# Patient Record
Sex: MALE | Race: BLACK OR AFRICAN AMERICAN | NOT HISPANIC OR LATINO | URBAN - METROPOLITAN AREA
[De-identification: names, ages, dates, MRNs, and addresses within clinical notes are randomized per-mention and may not be internally consistent; named-entity substitution may affect disease eponyms.]

---

## 2018-09-23 ENCOUNTER — OUTPATIENT (OUTPATIENT)
Dept: EMERGENCY DEPT | Facility: HOSPITAL | Age: 38
LOS: 1 days | Discharge: HOME | End: 2018-09-23
Payer: COMMERCIAL

## 2018-09-23 VITALS
TEMPERATURE: 99 F | DIASTOLIC BLOOD PRESSURE: 93 MMHG | HEART RATE: 110 BPM | OXYGEN SATURATION: 100 % | SYSTOLIC BLOOD PRESSURE: 152 MMHG | RESPIRATION RATE: 20 BRPM

## 2018-09-23 LAB
ALBUMIN SERPL ELPH-MCNC: 4.2 G/DL — SIGNIFICANT CHANGE UP (ref 3.3–5)
ALP SERPL-CCNC: 58 U/L — SIGNIFICANT CHANGE UP (ref 40–120)
ALT FLD-CCNC: 57 U/L — HIGH (ref 4–41)
APTT BLD: 28.7 SEC — SIGNIFICANT CHANGE UP (ref 27.5–37.4)
AST SERPL-CCNC: 34 U/L — SIGNIFICANT CHANGE UP (ref 4–40)
BASE EXCESS BLDV CALC-SCNC: 2.6 MMOL/L — SIGNIFICANT CHANGE UP
BASOPHILS # BLD AUTO: 0.06 K/UL — SIGNIFICANT CHANGE UP (ref 0–0.2)
BASOPHILS NFR BLD AUTO: 0.9 % — SIGNIFICANT CHANGE UP (ref 0–2)
BILIRUB SERPL-MCNC: 0.2 MG/DL — SIGNIFICANT CHANGE UP (ref 0.2–1.2)
BLOOD GAS VENOUS - CREATININE: 1.51 MG/DL — HIGH (ref 0.5–1.3)
BUN SERPL-MCNC: 12 MG/DL — SIGNIFICANT CHANGE UP (ref 7–23)
CALCIUM SERPL-MCNC: 9.1 MG/DL — SIGNIFICANT CHANGE UP (ref 8.4–10.5)
CHLORIDE BLDV-SCNC: 107 MMOL/L — SIGNIFICANT CHANGE UP (ref 96–108)
CHLORIDE SERPL-SCNC: 104 MMOL/L — SIGNIFICANT CHANGE UP (ref 98–107)
CO2 SERPL-SCNC: 24 MMOL/L — SIGNIFICANT CHANGE UP (ref 22–31)
CREAT SERPL-MCNC: 1.67 MG/DL — HIGH (ref 0.5–1.3)
EOSINOPHIL # BLD AUTO: 0.24 K/UL — SIGNIFICANT CHANGE UP (ref 0–0.5)
EOSINOPHIL NFR BLD AUTO: 3.7 % — SIGNIFICANT CHANGE UP (ref 0–6)
GAS PNL BLDV: 143 MMOL/L — SIGNIFICANT CHANGE UP (ref 136–146)
GLUCOSE BLDV-MCNC: 105 — HIGH (ref 70–99)
GLUCOSE SERPL-MCNC: 100 MG/DL — HIGH (ref 70–99)
HCO3 BLDV-SCNC: 25 MMOL/L — SIGNIFICANT CHANGE UP (ref 20–27)
HCT VFR BLD CALC: 43.2 % — SIGNIFICANT CHANGE UP (ref 39–50)
HCT VFR BLDV CALC: 45.7 % — SIGNIFICANT CHANGE UP (ref 39–51)
HGB BLD-MCNC: 14.4 G/DL — SIGNIFICANT CHANGE UP (ref 13–17)
HGB BLDV-MCNC: 14.9 G/DL — SIGNIFICANT CHANGE UP (ref 13–17)
IMM GRANULOCYTES # BLD AUTO: 0.02 # — SIGNIFICANT CHANGE UP
IMM GRANULOCYTES NFR BLD AUTO: 0.3 % — SIGNIFICANT CHANGE UP (ref 0–1.5)
INR BLD: 1.08 — SIGNIFICANT CHANGE UP (ref 0.88–1.17)
LACTATE BLDV-MCNC: 1.7 MMOL/L — SIGNIFICANT CHANGE UP (ref 0.5–2)
LYMPHOCYTES # BLD AUTO: 3.74 K/UL — HIGH (ref 1–3.3)
LYMPHOCYTES # BLD AUTO: 57.6 % — HIGH (ref 13–44)
MCHC RBC-ENTMCNC: 29.4 PG — SIGNIFICANT CHANGE UP (ref 27–34)
MCHC RBC-ENTMCNC: 33.3 % — SIGNIFICANT CHANGE UP (ref 32–36)
MCV RBC AUTO: 88.3 FL — SIGNIFICANT CHANGE UP (ref 80–100)
MONOCYTES # BLD AUTO: 0.64 K/UL — SIGNIFICANT CHANGE UP (ref 0–0.9)
MONOCYTES NFR BLD AUTO: 9.9 % — SIGNIFICANT CHANGE UP (ref 2–14)
NEUTROPHILS # BLD AUTO: 1.79 K/UL — LOW (ref 1.8–7.4)
NEUTROPHILS NFR BLD AUTO: 27.6 % — LOW (ref 43–77)
NRBC # FLD: 0 — SIGNIFICANT CHANGE UP
PCO2 BLDV: 48 MMHG — SIGNIFICANT CHANGE UP (ref 41–51)
PH BLDV: 7.37 PH — SIGNIFICANT CHANGE UP (ref 7.32–7.43)
PLATELET # BLD AUTO: 244 K/UL — SIGNIFICANT CHANGE UP (ref 150–400)
PMV BLD: 9.7 FL — SIGNIFICANT CHANGE UP (ref 7–13)
PO2 BLDV: 30 MMHG — LOW (ref 35–40)
POTASSIUM BLDV-SCNC: 3.7 MMOL/L — SIGNIFICANT CHANGE UP (ref 3.4–4.5)
POTASSIUM SERPL-MCNC: 3.9 MMOL/L — SIGNIFICANT CHANGE UP (ref 3.5–5.3)
POTASSIUM SERPL-SCNC: 3.9 MMOL/L — SIGNIFICANT CHANGE UP (ref 3.5–5.3)
PROT SERPL-MCNC: 7.8 G/DL — SIGNIFICANT CHANGE UP (ref 6–8.3)
PROTHROM AB SERPL-ACNC: 12.4 SEC — SIGNIFICANT CHANGE UP (ref 9.8–13.1)
RBC # BLD: 4.89 M/UL — SIGNIFICANT CHANGE UP (ref 4.2–5.8)
RBC # FLD: 13.9 % — SIGNIFICANT CHANGE UP (ref 10.3–14.5)
SAO2 % BLDV: 49.8 % — LOW (ref 60–85)
SODIUM SERPL-SCNC: 141 MMOL/L — SIGNIFICANT CHANGE UP (ref 135–145)
TROPONIN T, HIGH SENSITIVITY: 10 NG/L — SIGNIFICANT CHANGE UP (ref ?–14)
TROPONIN T, HIGH SENSITIVITY: 9 NG/L — SIGNIFICANT CHANGE UP (ref ?–14)
WBC # BLD: 6.49 K/UL — SIGNIFICANT CHANGE UP (ref 3.8–10.5)
WBC # FLD AUTO: 6.49 K/UL — SIGNIFICANT CHANGE UP (ref 3.8–10.5)

## 2018-09-23 RX ORDER — ASPIRIN/CALCIUM CARB/MAGNESIUM 324 MG
324 TABLET ORAL ONCE
Qty: 0 | Refills: 0 | Status: COMPLETED | OUTPATIENT
Start: 2018-09-23 | End: 2018-09-23

## 2018-09-23 RX ADMIN — Medication 324 MILLIGRAM(S): at 20:18

## 2018-09-23 NOTE — ED CDU PROVIDER INITIAL DAY NOTE - MEDICAL DECISION MAKING DETAILS
38 yo M with HTN, obese, smoker. Pt with TWI on EKG in ED, heart score 3, sent to CDU for tele and stress in am.

## 2018-09-23 NOTE — ED PROVIDER NOTE - PHYSICAL EXAMINATION
Gen: Well appearing, well nourished, awake, alert, oriented to person, place, time/situation and in no apparent distress.  ENMT: Airway patent. Moist mucous membranes.  Cardiac: Normal rate, regular rhythm.  Heart sounds S1, S2.  Respiratory: Breath sounds clear and equal bilaterally. No wheezes/rales/rhonchi.  Abdomen: Abdomen soft, non-distended, non-tender, no guarding.  Musculoskeletal: Atraumatic. No vascular compromise. No calf swelling/tenderness. No pedal edema.  Neuro: Alert, follows commands. Speech is clear, fluent, and appropriate. Moving all extremities spontaneously.  Skin: Skin normal color for race, warm, dry and intact. No evidence of rash.

## 2018-09-23 NOTE — ED PROVIDER NOTE - ATTENDING CONTRIBUTION TO CARE
Kraus: 37 yom with intermittent chest pain substernal, at rest with no sob, no palpitations, non exertional , no cough, no fever.  No previous episodes.  Sxs ongoing for 2 days.  +smoker, no leg swelling, no travel. On exam, pt is well appearing, no distress, clear lungs, BP high 166/80s, nml cardiac, abd soft and non tender, no pitting edema.  EKG shows lateral T wave inversion, no preiovus.  Given abnml EKG will place in CDU and stress with labs, cxr negative.

## 2018-09-23 NOTE — ED CDU PROVIDER INITIAL DAY NOTE - OBJECTIVE STATEMENT
37M h/o HTN p/w chest pain, intermittent since yesterday, onset at rest. The pain is retrosternal, non-radiating, not positional/pleuritic/exertional, lasts a few minutes at a time. +Smoker. No prior cardiac workup. No associated fever, cough, SOB, diaphoresis, or vomiting. No hx of DVT/PE/cancer, calf pain/swelling, hemoptysis, or recent trauma/surgery/immobilization.    CDU JESSICA Yang: 37M h/o HTN p/w chest pain, intermittent since yesterday, onset at rest. The pain is retrosternal, non-radiating, not positional/pleuritic/exertional, lasts a few minutes at a time. +Smoker. No prior cardiac workup. No associated fever, cough, SOB, diaphoresis, or vomiting. No hx of DVT/PE/cancer, calf pain/swelling, hemoptysis, or recent trauma/surgery/immobilization.    CDU JESSICA Jonesanirudh: 36 yo M obeses, with PMHX of HTN (never formally diagnosed, not on medication), everyday smoker presents to the ED c/o midsternal non radiating, non pleuritic, non exertional, chest pain, that he becames more aware of at rest, pt reports that last minutes at a time, and began yesterday for the first time, never had this pain in the past. No fam hx, no previous cardiac workup, Denies fever, chills, sob, cough ,URI symptoms, diaphoresis, nausea, vomiting, abdominal pain, leg swelling, recent travel, DVT/ PE. Pt with TWI on EKG in ED, heart score 3, sent to CDU for tele and stress in am. Pt is currently without cp.

## 2018-09-23 NOTE — ED PROVIDER NOTE - OBJECTIVE STATEMENT
37M h/o HTN p/w chest pain, intermittent since yesterday, onset at rest. The pain is retrosternal, non-radiating, not positional/pleuritic/exertional, lasts a few minutes at a time. +Smoker. No prior cardiac workup. No associated fever, cough, SOB, diaphoresis, or vomiting. No hx of DVT/PE/cancer, calf pain/swelling, hemoptysis, or recent trauma/surgery/immobilization.

## 2018-09-23 NOTE — ED ADULT NURSE NOTE - NSIMPLEMENTINTERV_GEN_ALL_ED
Implemented All Universal Safety Interventions:  [place] to call system. Call bell, personal items and telephone within reach. Instruct patient to call for assistance. Room bathroom lighting operational. Non-slip footwear when patient is off stretcher. Physically safe environment: no spills, clutter or unnecessary equipment. Stretcher in lowest position, wheels locked, appropriate side rails in place.

## 2018-09-23 NOTE — ED ADULT NURSE REASSESSMENT NOTE - NS ED NURSE REASSESS COMMENT FT1
report received at shift change from Mandi BEDOYA RN, pt remains AAOx3, VS as noted, pt in NAD, MD Kraus at bedside for eval, pt awaiting results, will continue to monitor pt.

## 2018-09-23 NOTE — ED PROVIDER NOTE - MEDICAL DECISION MAKING DETAILS
37M p/w CP. concern for ACS (heart score = 3). Presentation not consistent with PE, PNA, or dissection. Plan: ekg, cxr, labs, aspirin, possible CDU for stress test.

## 2018-09-23 NOTE — ED CDU PROVIDER INITIAL DAY NOTE - DETAILS
Chest pain: tele, stress in am.  cex2 10 and 9  EKG with TWI in II, V5V6, rpt ekg unchanged. CXr clear lungs

## 2018-09-23 NOTE — ED CDU PROVIDER INITIAL DAY NOTE - ATTENDING CONTRIBUTION TO CARE
Kraus: 37 yom with intermittent chest pain substernal, at rest with no sob, no palpitations, non exertional , no cough, no fever.  No previous episodes.  Sxs ongoing for 2 days.  +smoker, no leg swelling, no travel. On exam, pt is well appearing, no distress, clear lungs, BP high 166/80s, nml cardiac, abd soft and non tender, no pitting edema.  EKG shows lateral T wave inversion, no preiovus.  Given abnml EKG pt placed in CDU for serial enzymes, and stress test in AM.

## 2018-09-23 NOTE — ED ADULT TRIAGE NOTE - CHIEF COMPLAINT QUOTE
states" I am having chest pain started since yesterday and is getting worst". denies any PMH. saline lock josefa left arm by EMS

## 2018-09-23 NOTE — ED ADULT NURSE NOTE - OBJECTIVE STATEMENT
patient alert ox3 came in c/o chest pain started since yesterday and got worst today " states the pain feel like in the middle of the chest .denies any PMH. labs done as ordered. EKG shows T inversions in lateral leads. awaiting results and re eval.

## 2018-09-24 ENCOUNTER — TRANSCRIPTION ENCOUNTER (OUTPATIENT)
Age: 38
End: 2018-09-24

## 2018-09-24 VITALS
OXYGEN SATURATION: 98 % | HEART RATE: 87 BPM | RESPIRATION RATE: 16 BRPM | TEMPERATURE: 99 F | DIASTOLIC BLOOD PRESSURE: 60 MMHG | SYSTOLIC BLOOD PRESSURE: 127 MMHG

## 2018-09-24 DIAGNOSIS — R94.39 ABNORMAL RESULT OF OTHER CARDIOVASCULAR FUNCTION STUDY: ICD-10-CM

## 2018-09-24 DIAGNOSIS — I10 ESSENTIAL (PRIMARY) HYPERTENSION: ICD-10-CM

## 2018-09-24 DIAGNOSIS — R07.9 CHEST PAIN, UNSPECIFIED: ICD-10-CM

## 2018-09-24 DIAGNOSIS — F17.200 NICOTINE DEPENDENCE, UNSPECIFIED, UNCOMPLICATED: ICD-10-CM

## 2018-09-24 DIAGNOSIS — Z29.9 ENCOUNTER FOR PROPHYLACTIC MEASURES, UNSPECIFIED: ICD-10-CM

## 2018-09-24 LAB
ALBUMIN SERPL ELPH-MCNC: 4.1 G/DL — SIGNIFICANT CHANGE UP (ref 3.3–5)
ALP SERPL-CCNC: 58 U/L — SIGNIFICANT CHANGE UP (ref 40–120)
ALT FLD-CCNC: 64 U/L — HIGH (ref 4–41)
AST SERPL-CCNC: 36 U/L — SIGNIFICANT CHANGE UP (ref 4–40)
BILIRUB SERPL-MCNC: 0.5 MG/DL — SIGNIFICANT CHANGE UP (ref 0.2–1.2)
BUN SERPL-MCNC: 15 MG/DL — SIGNIFICANT CHANGE UP (ref 7–23)
CALCIUM SERPL-MCNC: 9.3 MG/DL — SIGNIFICANT CHANGE UP (ref 8.4–10.5)
CHLORIDE SERPL-SCNC: 104 MMOL/L — SIGNIFICANT CHANGE UP (ref 98–107)
CO2 SERPL-SCNC: 26 MMOL/L — SIGNIFICANT CHANGE UP (ref 22–31)
CREAT SERPL-MCNC: 1.41 MG/DL — HIGH (ref 0.5–1.3)
GLUCOSE SERPL-MCNC: 92 MG/DL — SIGNIFICANT CHANGE UP (ref 70–99)
HBA1C BLD-MCNC: 6.2 % — HIGH (ref 4–5.6)
POTASSIUM SERPL-MCNC: 4.1 MMOL/L — SIGNIFICANT CHANGE UP (ref 3.5–5.3)
POTASSIUM SERPL-SCNC: 4.1 MMOL/L — SIGNIFICANT CHANGE UP (ref 3.5–5.3)
PROT SERPL-MCNC: 7.7 G/DL — SIGNIFICANT CHANGE UP (ref 6–8.3)
SODIUM SERPL-SCNC: 142 MMOL/L — SIGNIFICANT CHANGE UP (ref 135–145)

## 2018-09-24 PROCEDURE — 93018 CV STRESS TEST I&R ONLY: CPT | Mod: GC

## 2018-09-24 PROCEDURE — 93016 CV STRESS TEST SUPVJ ONLY: CPT | Mod: GC

## 2018-09-24 PROCEDURE — 78452 HT MUSCLE IMAGE SPECT MULT: CPT | Mod: 26

## 2018-09-24 RX ORDER — INFLUENZA VIRUS VACCINE 15; 15; 15; 15 UG/.5ML; UG/.5ML; UG/.5ML; UG/.5ML
0.5 SUSPENSION INTRAMUSCULAR ONCE
Qty: 0 | Refills: 0 | Status: COMPLETED | OUTPATIENT
Start: 2018-09-24 | End: 2018-09-24

## 2018-09-24 RX ORDER — ASPIRIN/CALCIUM CARB/MAGNESIUM 324 MG
81 TABLET ORAL DAILY
Qty: 0 | Refills: 0 | Status: DISCONTINUED | OUTPATIENT
Start: 2018-09-24 | End: 2018-09-24

## 2018-09-24 RX ORDER — SODIUM CHLORIDE 9 MG/ML
500 INJECTION INTRAMUSCULAR; INTRAVENOUS; SUBCUTANEOUS
Qty: 0 | Refills: 0 | Status: DISCONTINUED | OUTPATIENT
Start: 2018-09-24 | End: 2018-09-24

## 2018-09-24 RX ADMIN — Medication 81 MILLIGRAM(S): at 13:38

## 2018-09-24 RX ADMIN — SODIUM CHLORIDE 100 MILLILITER(S): 9 INJECTION INTRAMUSCULAR; INTRAVENOUS; SUBCUTANEOUS at 17:10

## 2018-09-24 NOTE — H&P ADULT - HISTORY OF PRESENT ILLNESS
38 yo male pmhx HTN, obesity, smoker, presenting to [facility] ED for intermittent chest pain (CP) starting yesterday afternoon. Pt states that he was just sitting at his desk when he had a "fluttering" pain, rating it at an 8/10. The pain lasted for 2 minutes and went away. Pt states the the pain was on the left side of his chest without radiation. He continued to have intermittent CP throughout the day, so he came to the [facility] ED around 2030 last night. Pt admits to some palpitations associated with the CP.  CP is not positional, pleuritic, exertional, reproducible, or associated with diet. Pt denies any SOB, diaphoresis, nausea, vomiting, dizziness, b/l extremity swelling, BLANCAS, fevers, chills, cough, and PND. He has experienced pain like this in the past and went to see his NP, where it was recommended that he start taking metoprolol. Pt did not comply with recommendation, rather "wanted to try lifestyle modifications first." 38 yo male pmhx HTN, obesity, smoker, presenting to [facility] ED for intermittent chest pain (CP) starting yesterday afternoon. Pt states that he was just sitting at his desk when he had a "fluttering" pain, rating it at an 8/10. The pain lasted for 2 minutes and went away. Pt states the the pain was on the left side of his chest without radiation. He continued to have intermittent CP throughout the day, so he came to the [facility] ED around 2030 last night. Pt admits to some palpitations associated with the CP.  CP is not positional, pleuritic, exertional, reproducible, or associated with diet. Pt denies any SOB, diaphoresis, nausea, vomiting, dizziness, b/l extremity swelling, BLANCAS, fevers, chills, cough, and PND. He has experienced pain like this in the past and went to see his NP, where it was recommended that he start taking metoprolol. Pt did not comply with recommendation, rather "wanted to try lifestyle modifications first."     Today pt had 1mm ST elevations during stress test V1-V4, no CP. 38 yo male pmhx HTN, obesity, smoker, presenting to [facility] ED for intermittent chest pain (CP) starting yesterday afternoon. Pt states that he was just sitting at his desk when he had a "fluttering" pain, rating it at an 8/10. The pain lasted for 2 minutes and went away. Pt states the the pain was on the left side of his chest without radiation. He continued to have intermittent CP throughout the day, so he came to the [facility] ED around 2030 last night. Pt admits to some palpitations associated with the CP.  CP is not positional, pleuritic, exertional, reproducible, or associated with diet. Pt denies any SOB, diaphoresis, nausea, vomiting, dizziness, b/l extremity swelling, BLANCAS, fevers, chills, cough, and PND. He has experienced pain like this in the past and went to see his NP, where it was recommended that he start taking metoprolol. Pt did not comply with recommendation, rather "wanted to try lifestyle modifications first."     In CDU pt had a Nuclear stress test, which was abnormal and admitted for cardiac cath.

## 2018-09-24 NOTE — ED CDU PROVIDER SUBSEQUENT DAY NOTE - ATTENDING CONTRIBUTION TO CARE
Ti: 38 yo male with no significant medical history (likely some undiagnosed borderline HTN) presented to the ED for substernal chest pain that began the day prior to presentation. Pain began at rest. Pt denies any associated SOB, abdominal pain, nausea or vomiting. NO Le pain or edema. No recent travel or immobilization. Pt currently asymptomatic. Ed workup remarkable for abnormal EKG- inverted T waves. Exam: GENERAL: well appearing, NAD, HEENT: MMM, PERRLA, CARDIO: +S1/S2, no murmurs, rubs or gallops, LUNGS: CTA B/L, no wheezing, rales or rhonchi, ABD: soft, nontender, BSx4 quadrants, no guarding or rigidity. EXT: No LE edema or calf TTP, 2+ distal pulses x 4 extremities. NEURO: AxOx3, SKIN: no rashes or lesions, well perfused A/P- 38 yo male with chest pain and abnormal EKG. CDU plan for stress test. will continue tele monitoring.

## 2018-09-24 NOTE — H&P ADULT - NSHPSOCIALHISTORY_GEN_ALL_CORE
Pt lives at home in [place] with wife and kids, smoke tobacco around "1 pack a week", and drinks Thursday through Saturday (2 glasses of wine a night). Pt is an EMT.

## 2018-09-24 NOTE — DISCHARGE NOTE ADULT - HOSPITAL COURSE
38 yo male pmhx HTN, obesity, smoker, presenting to [facility] ED for intermittent chest pain (CP) starting yesterday afternoon. Pt states that he was just sitting at his desk when he had a "fluttering" pain, rating it at an 8/10. The pain lasted for 2 minutes and went away. Pt states the the pain was on the left side of his chest without radiation. He continued to have intermittent CP throughout the day, so he came to the [facility] ED around 2030 last night. Pt admits to some palpitations associated with the CP.  CP is not positional, pleuritic, exertional, reproducible, or associated with diet. Pt denies any SOB, diaphoresis, nausea, vomiting, dizziness, b/l extremity swelling, BLANCAS, fevers, chills, cough, and PND. He has experienced pain like this in the past and went to see his NP, where it was recommended that he start taking metoprolol. Pt did not comply with recommendation, rather "wanted to try lifestyle modifications first."   In CDU pt had a Nuclear stress test, which was abnormal and admitted for cardiac cath.    Pt's cardiac catheretization showed luminal irregularities and EF 65%.  Pt determined to be stable for discharge post procedure and will follow up with Dr. Epps in 1-2 weeks as an outpatient

## 2018-09-24 NOTE — H&P ADULT - ATTENDING COMMENTS
Patient seen and examined.  Agree with above pa note.  all labs/tests reviewed  patient with htn, smoker admitted with new onset chest pain  nuclear stress with large lateral defect  no cp free  Patient denies any chest pain, dyspnea, palpitations, cough, syncope, edema, exertional symptoms, nausea, abdominal pain, fever, chills,  or rash.  Denies any history of CAD, MI, valve disease, cardiomyopathy, or congenital heart disease.  denies any illicit drug use, steroid use    exam normal   ecg no acute ischemic changes  judy negative    a/P    37 year old man with HTN, tobacco use adm with chest pain, with abnl nuclear stress test     1. Chest pain/abnl stress  await cath today   asa  dispo pending above  monitor creat

## 2018-09-24 NOTE — H&P ADULT - RS GEN PE MLT RESP DETAILS PC
respirations non-labored/no subcutaneous emphysema/no rales/no chest wall tenderness/no wheezes/clear to auscultation bilaterally/no intercostal retractions/no rhonchi/airway patent/breath sounds equal/good air movement/normal

## 2018-09-24 NOTE — H&P ADULT - NEUROLOGICAL DETAILS
sensation intact/cranial nerves intact/alert and oriented x 3/responds to verbal commands/normal strength

## 2018-09-24 NOTE — H&P ADULT - PROBLEM SELECTOR PLAN 1
Admit to telemetry  Trend Bobbi   Serial EKGs  lipid profile, HbA1c  TTE  Left Heart Catheterization Admit to telemetry  Trend Bobbi   Serial EKGs  lipid profile, HbA1c  NPO for Left Heart Catheterization  Started on Ecotrin 81mg po daily

## 2018-09-24 NOTE — ED CDU PROVIDER SUBSEQUENT DAY NOTE - HISTORY
36 yo M obeses, with PMHX of HTN (never formally diagnosed, not on medication), everyday smoker presents to the ED c/o midsternal non radiating, non pleuritic, non exertional, chest pain, that he becames more aware of at rest, pt reports that last minutes at a time, and began yesterday for the first time, never had this pain in the past. No fam hx, no previous cardiac workup, Denies fever, chills, sob, cough ,URI symptoms, diaphoresis, nausea, vomiting, abdominal pain, leg swelling, recent travel, DVT/ PE. Pt with TWI on EKG in ED, heart score 3, sent to CDU for tele and stress in am. Pt is currently without cp.  Pt completed stress , pending results.   Denies any c pain, sob or complaints, feeling well. 38 yo M obese, with PMHX of HTN (never formally diagnosed, not on medication), everyday smoker presents to the ED c/o midsternal non radiating, non pleuritic, non exertional, chest pain, that he becames more aware of at rest, pt reports that last minutes at a time, and began yesterday for the first time, never had this pain in the past. No fam hx, no previous cardiac workup, Denies fever, chills, sob, cough ,URI symptoms, diaphoresis, nausea, vomiting, abdominal pain, leg swelling, recent travel, DVT/ PE. Pt with TWI on EKG in ED, heart score 3, sent to CDU for tele and stress in am. Pt is currently without cp.  Pt completed stress , pending results.   Denies any c pain, sob or complaints, feeling well.

## 2018-09-24 NOTE — DISCHARGE NOTE ADULT - PATIENT PORTAL LINK FT
You can access the [facility] Patient Portal, offered by [facility], by registering with the following website: [email or web address]

## 2018-09-24 NOTE — H&P ADULT - PROBLEM SELECTOR PLAN 4
Pt reports she will quit on her own, refused smoking cessation nicotine. Pt reports he will quit on her own, refused smoking cessation nicotine.

## 2018-09-24 NOTE — H&P ADULT - NSHPLABSRESULTS_GEN_ALL_CORE
14.4   6.49  )-----------( 244                  43.2   09-24    142  |  104  |  15  ----------------------------<  92  4.1   |  26  |  1.41<H>    Ca    9.3         TPro  7.7  /  Alb  4.1  /  TBili  0.5  /  DBili  x   /  AST  36  /  ALT  64<H>  /  AlkPhos  58  09-24    Troponin: 10-->9  EKG: NSR @ 95, TWI I, AvL, V5, V6, II  CXR: clear lungs

## 2018-09-24 NOTE — DISCHARGE NOTE ADULT - CARE PLAN
Principal Discharge DX:	Chest pain, unspecified type  Goal:	Resolution of symptoms  Assessment and plan of treatment:	Follow up with your Primary Care Doctor or Cardiologist in 1-2 weeks  If symptoms reoccur call your doctor and if unreachable return to Emergency Department  Secondary Diagnosis:	NENITA (acute kidney injury)  Assessment and plan of treatment:	Have repeat blood work within 1-2 weeks  Increase daily fluid intake

## 2018-09-24 NOTE — H&P ADULT - ASSESSMENT
36 yo male pmhx HTN, obesity, smoker, presenting to [facility] ED for intermittent chest pain (CP) starting yesterday afternoon, admit to telemetry due to abnormal stress test.

## 2018-09-24 NOTE — DISCHARGE NOTE ADULT - CARE PROVIDER_API CALL
Isai Epps (MD), Cardiovascular Disease; Internal Medicine; Interventional Cardiology; Nuclear Cardiology  [address]  [place] [address]  Phone: [phone]  Fax: [phone]

## 2018-09-24 NOTE — ED CDU PROVIDER SUBSEQUENT DAY NOTE - PROGRESS NOTE DETAILS
Call from Dr Murrieta,  large lateral wall defect.   Pt with ST dep/sx's.  Recommending., Cath.  SW Dr Isai Epps , will admit patient.     Text paged and sw tele pA Call from Dr Murrieta,  large lateral wall defect.   Pt with ST dep. Asx at current time. Dr Murrieta Recommending., Cath.  SW Dr Isai Epps , will admit patient. No heparin at this time    Text paged and sw tele pA

## 2018-09-24 NOTE — DISCHARGE NOTE ADULT - PLAN OF CARE
Resolution of symptoms Follow up with your Primary Care Doctor or Cardiologist in 1-2 weeks  If symptoms reoccur call your doctor and if unreachable return to Emergency Department Have repeat blood work within 1-2 weeks  Increase daily fluid intake

## 2018-09-24 NOTE — ED CDU PROVIDER DISPOSITION NOTE - CLINICAL COURSE
36 yo male with no significant medical history (likely some undiagnosed borderline HTN) presented to the ED for substernal chest pain that began the day prior to presentation. Pain began at rest. Pt denies any associated SOB, abdominal pain, nausea or vomiting. NO Le pain or edema. No recent travel or immobilization. Pt currently asymptomatic. Ed workup remarkable for abnormal EKG- inverted T waves. Exam: GENERAL: well appearing, NAD, HEENT: MMM, PERRLA, CARDIO: +S1/S2, no murmurs, rubs or gallops, LUNGS: CTA B/L, no wheezing, rales or rhonchi, ABD: soft, nontender, BSx4 quadrants, no guarding or rigidity. EXT: No LE edema or calf TTP, 2+ distal pulses x 4 extremities. NEURO: AxOx3, SKIN: no rashes or lesions, well perfused A/P- 36 yo male with chest pain and abnormal EKG. Nuclear Stress Test shows lateral wall abnormality. Pt admitted to cardiology for further care.,

## 2018-11-26 ENCOUNTER — EMERGENCY (EMERGENCY)
Facility: HOSPITAL | Age: 38
LOS: 1 days | Discharge: HOME | End: 2018-11-26
Attending: EMERGENCY MEDICINE | Admitting: EMERGENCY MEDICINE
Payer: SELF-PAY

## 2018-11-26 VITALS
TEMPERATURE: 98 F | HEART RATE: 83 BPM | RESPIRATION RATE: 19 BRPM | OXYGEN SATURATION: 98 % | DIASTOLIC BLOOD PRESSURE: 70 MMHG | SYSTOLIC BLOOD PRESSURE: 190 MMHG

## 2018-11-26 VITALS
HEART RATE: 109 BPM | SYSTOLIC BLOOD PRESSURE: 158 MMHG | TEMPERATURE: 98 F | DIASTOLIC BLOOD PRESSURE: 98 MMHG | RESPIRATION RATE: 16 BRPM | OXYGEN SATURATION: 99 %

## 2018-11-26 PROBLEM — I10 ESSENTIAL (PRIMARY) HYPERTENSION: Chronic | Status: ACTIVE | Noted: 2018-09-23

## 2018-11-26 LAB
ALBUMIN SERPL ELPH-MCNC: 3.8 G/DL — SIGNIFICANT CHANGE UP (ref 3.3–5)
ALP SERPL-CCNC: 58 U/L — SIGNIFICANT CHANGE UP (ref 40–120)
ALT FLD-CCNC: 27 U/L — SIGNIFICANT CHANGE UP (ref 4–41)
AST SERPL-CCNC: 17 U/L — SIGNIFICANT CHANGE UP (ref 4–40)
BASOPHILS # BLD AUTO: 0.04 K/UL — SIGNIFICANT CHANGE UP (ref 0–0.2)
BASOPHILS NFR BLD AUTO: 0.7 % — SIGNIFICANT CHANGE UP (ref 0–2)
BILIRUB SERPL-MCNC: 0.2 MG/DL — SIGNIFICANT CHANGE UP (ref 0.2–1.2)
BUN SERPL-MCNC: 14 MG/DL — SIGNIFICANT CHANGE UP (ref 7–23)
CALCIUM SERPL-MCNC: 9.1 MG/DL — SIGNIFICANT CHANGE UP (ref 8.4–10.5)
CHLORIDE SERPL-SCNC: 106 MMOL/L — SIGNIFICANT CHANGE UP (ref 98–107)
CO2 SERPL-SCNC: 22 MMOL/L — SIGNIFICANT CHANGE UP (ref 22–31)
CREAT SERPL-MCNC: 1.21 MG/DL — SIGNIFICANT CHANGE UP (ref 0.5–1.3)
D DIMER BLD IA.RAPID-MCNC: < 150 NG/ML — SIGNIFICANT CHANGE UP
EOSINOPHIL # BLD AUTO: 0.36 K/UL — SIGNIFICANT CHANGE UP (ref 0–0.5)
EOSINOPHIL NFR BLD AUTO: 6 % — SIGNIFICANT CHANGE UP (ref 0–6)
GLUCOSE SERPL-MCNC: 101 MG/DL — HIGH (ref 70–99)
HCT VFR BLD CALC: 42.5 % — SIGNIFICANT CHANGE UP (ref 39–50)
HGB BLD-MCNC: 13.8 G/DL — SIGNIFICANT CHANGE UP (ref 13–17)
IMM GRANULOCYTES # BLD AUTO: 0.01 # — SIGNIFICANT CHANGE UP
IMM GRANULOCYTES NFR BLD AUTO: 0.2 % — SIGNIFICANT CHANGE UP (ref 0–1.5)
LYMPHOCYTES # BLD AUTO: 2.57 K/UL — SIGNIFICANT CHANGE UP (ref 1–3.3)
LYMPHOCYTES # BLD AUTO: 42.8 % — SIGNIFICANT CHANGE UP (ref 13–44)
MCHC RBC-ENTMCNC: 28.5 PG — SIGNIFICANT CHANGE UP (ref 27–34)
MCHC RBC-ENTMCNC: 32.5 % — SIGNIFICANT CHANGE UP (ref 32–36)
MCV RBC AUTO: 87.8 FL — SIGNIFICANT CHANGE UP (ref 80–100)
MONOCYTES # BLD AUTO: 0.59 K/UL — SIGNIFICANT CHANGE UP (ref 0–0.9)
MONOCYTES NFR BLD AUTO: 9.8 % — SIGNIFICANT CHANGE UP (ref 2–14)
NEUTROPHILS # BLD AUTO: 2.44 K/UL — SIGNIFICANT CHANGE UP (ref 1.8–7.4)
NEUTROPHILS NFR BLD AUTO: 40.5 % — LOW (ref 43–77)
NRBC # FLD: 0 — SIGNIFICANT CHANGE UP
PLATELET # BLD AUTO: 225 K/UL — SIGNIFICANT CHANGE UP (ref 150–400)
PMV BLD: 9.7 FL — SIGNIFICANT CHANGE UP (ref 7–13)
POTASSIUM SERPL-MCNC: 3.9 MMOL/L — SIGNIFICANT CHANGE UP (ref 3.5–5.3)
POTASSIUM SERPL-SCNC: 3.9 MMOL/L — SIGNIFICANT CHANGE UP (ref 3.5–5.3)
PROT SERPL-MCNC: 7.2 G/DL — SIGNIFICANT CHANGE UP (ref 6–8.3)
RBC # BLD: 4.84 M/UL — SIGNIFICANT CHANGE UP (ref 4.2–5.8)
RBC # FLD: 14 % — SIGNIFICANT CHANGE UP (ref 10.3–14.5)
SODIUM SERPL-SCNC: 140 MMOL/L — SIGNIFICANT CHANGE UP (ref 135–145)
WBC # BLD: 6.01 K/UL — SIGNIFICANT CHANGE UP (ref 3.8–10.5)
WBC # FLD AUTO: 6.01 K/UL — SIGNIFICANT CHANGE UP (ref 3.8–10.5)

## 2018-11-26 PROCEDURE — 99284 EMERGENCY DEPT VISIT MOD MDM: CPT | Mod: 25

## 2018-11-26 PROCEDURE — 99053 MED SERV 10PM-8AM 24 HR FAC: CPT

## 2018-11-26 RX ORDER — LISINOPRIL 2.5 MG/1
1 TABLET ORAL
Qty: 14 | Refills: 0
Start: 2018-11-26 | End: 2018-12-09

## 2018-11-26 RX ORDER — LISINOPRIL 2.5 MG/1
5 TABLET ORAL ONCE
Qty: 0 | Refills: 0 | Status: COMPLETED | OUTPATIENT
Start: 2018-11-26 | End: 2018-11-26

## 2018-11-26 RX ADMIN — LISINOPRIL 5 MILLIGRAM(S): 2.5 TABLET ORAL at 03:16

## 2018-11-26 NOTE — ED PROVIDER NOTE - MEDICAL DECISION MAKING DETAILS
minor flow limiting irregularities w/ no flow limiting lesions 39yo M pmh as above, underwent cardiac cath in September, here for intermittent sob X1 wk and abnormal heart rhythm on self-cardiac auscultation at home. Do not suspect ACS. Low suspicion for PE, but does not PERC out, therefore will get d-dimer. If normal, may f/u with pmd and cardiologist outpt for holter monitor and htn management. 39yo M pmh as above, underwent cardiac cath in September, here for intermittent sob X1 wk and abnormal heart rhythm on self-cardiac auscultation at home. ECG is sinus tachycardia w/ PVCs, ST segment/J point elevation in V1/V2 w/ TWIs in V5/V6 all present on prior ecg. Do not suspect ACS. Low suspicion for PE, but does not PERC out, therefore will get d-dimer. If normal, may f/u with pmd and cardiologist outpt for holter monitor and htn management.

## 2018-11-26 NOTE — ED PROVIDER NOTE - OBJECTIVE STATEMENT
Outpatient Provider:  Care Providers for Follow up (PCP/Outpatient Provider) Isai Epps), Cardiovascular Disease; Internal Medicine; Interventional Cardiology; Nuclear Cardiology  [address] [facility] [address]  [place] [address]  Phone: [phone]  Fax: [phone]. Pertinent PMH/PSH/FHx/SHx and Review of Systems contained within:  37yo M w/ pmh of htn and NENITA, seen in Sept for cp, had ischemic ecg findings and concerning stress test while in our CDU unit, therefore underwent cardiac catheterization showing minor luminal irregularities without flow limiting lesions, and discharged w/ instructions to f/u with pmd and cardiology, presents today for eval of palpitations. Pt states he didn't follow up outpt as instructed because his symptoms had resolved prior to discharge from recent hospitalization and he "felt fine". Pt reports that he has been having       Outpatient Provider:  Care Providers for Follow up (PCP/Outpatient Provider) Isai Epps), Cardiovascular Disease; Internal Medicine; Interventional Cardiology; Nuclear Cardiology  [address] [facility] [address] [place] [address]  Phone: [phone]  Fax: [phone]. Pertinent PMH/PSH/FHx/SHx and Review of Systems contained within:  37yo M w/ pmh of obesity, htn and NENITA, seen in Sept for cp, had ischemic ecg findings and concerning stress test while in our CDU unit, therefore underwent cardiac catheterization showing minor luminal irregularities without flow limiting lesions, and discharged w/ instructions to f/u with pmd and cardiology, presents today for eval of "palpitations". Pt states he didn't follow up outpt as instructed because his symptoms had resolved prior to discharge from recent hospitalization and he "felt fine". Pt reports that he has been having intermittent sob X 1wk, started after 2 hour flight to [place], not exertional, occurs mostly at rest when he begins "feeling anxious".  Pt states that a few hours ago, he felt transient sob, therefore listened to his own heart with a stethoscope and noticed that his heart "would beat quickly, then pause for a second or two", did NOT actually feel palpitation, but ausculation finding was concerning enough that he decided to come to ED. Denies chest pain/pressure, diaphoresis, hx of dvt or PE, LE edema or pain. No fever/chills, No photophobia/eye pain/changes in vision, No ear pain/sore throat/dysphagia, No chest pain, no cough/wheeze/stridor, No abdominal pain, No N/V/D, no dysuria/frequency/discharge, No neck/back pain, no rash, no new focal neuro symptoms.

## 2018-11-26 NOTE — ED ADULT NURSE NOTE - NSIMPLEMENTINTERV_GEN_ALL_ED
Implemented All Universal Safety Interventions:  [place] to call system. Call bell, personal items and telephone within reach. Instruct patient to call for assistance. Room bathroom lighting operational. Non-slip footwear when patient is off stretcher. Physically safe environment: no spills, clutter or unnecessary equipment. Stretcher in lowest position, wheels locked, appropriate side rails in place.

## 2018-11-26 NOTE — ED ADULT NURSE NOTE - OBJECTIVE STATEMENT
Pt received aa&ox4 no pertinent pmh p/w palpitations since last Tuesday. Pt states he's felt his "heart race" intermittently over the last week. Pt denies symptoms at this time. Pt placed on monitor NSR noted. Pt hypertensive in ED, MD aware.  Pt denies cp, sob, ab pain, fever chills. 20g IV placed in RT AC, lab sent. Will monitor.

## 2018-11-26 NOTE — ED PROVIDER NOTE - PHYSICAL EXAMINATION
Gen: Alert, NAD, anxious appearing, obese  Head: NC, AT,  EOMI, normal lids/conjunctiva  ENT:  normal hearing, patent oropharynx without erythema/exudate  Neck: +supple, no tenderness/meningismus/JVD, +Trachea midline  Chest: no chest wall tenderness, equal chest rise  Pulm: Bilateral BS, normal resp effort, no wheeze/stridor/retractions  CV: tachy, no M/R/G, +dist pulses  Abd: +BS, soft, NT/ND  Rectal: deferred  Mskel: no edema/erythema/cyanosis  Skin: no rash  Neuro: AAOx3, no sensory/motor deficits, CN 2-12 intact

## 2019-11-14 NOTE — H&P ADULT - NS MD HP PULSE CAROTID
right normal/left normal Bilobed Flap Text: The defect edges were debeveled with a #15c scalpel blade.  Given the location of the defect and the proximity to free margins a bilobe flap was deemed most appropriate.  Using a sterile surgical marker, an appropriate bilobe flap drawn around the defect.    The area thus outlined was incised deep to adipose tissue with a #15 scalpel blade.  The skin margins were undermined to an appropriate distance in all directions utilizing iris scissors.

## 2020-06-26 PROBLEM — Z00.00 ENCOUNTER FOR PREVENTIVE HEALTH EXAMINATION: Status: ACTIVE | Noted: 2020-06-26

## 2020-07-06 ENCOUNTER — APPOINTMENT (OUTPATIENT)
Dept: SURGERY | Facility: CLINIC | Age: 40
End: 2020-07-06
Payer: COMMERCIAL

## 2020-07-06 ENCOUNTER — TRANSCRIPTION ENCOUNTER (OUTPATIENT)
Age: 40
End: 2020-07-06

## 2020-07-06 DIAGNOSIS — I10 ESSENTIAL (PRIMARY) HYPERTENSION: ICD-10-CM

## 2020-07-06 DIAGNOSIS — E66.01 MORBID (SEVERE) OBESITY DUE TO EXCESS CALORIES: ICD-10-CM

## 2020-07-06 DIAGNOSIS — Z78.9 OTHER SPECIFIED HEALTH STATUS: ICD-10-CM

## 2020-07-06 DIAGNOSIS — F17.200 NICOTINE DEPENDENCE, UNSPECIFIED, UNCOMPLICATED: ICD-10-CM

## 2020-07-06 DIAGNOSIS — F17.210 NICOTINE DEPENDENCE, CIGARETTES, UNCOMPLICATED: ICD-10-CM

## 2020-07-06 PROCEDURE — 99244 OFF/OP CNSLTJ NEW/EST MOD 40: CPT | Mod: GT

## 2020-07-06 NOTE — HISTORY OF PRESENT ILLNESS
[Home] : at home, [unfilled] , at the time of the visit. [Verbal consent obtained from patient] : the patient, [unfilled] [Medical Office: ([facility])___] : at the medical office located in  [de-identified] : Cornelius Lugo is a 39 year old male here for a weight loss consultation visit. \par \par Patient reports a significant increase in weight the last several years while he was in school and working full-time.  He is currently at his highest weight, 350 pounds, and has noted a significant effect on his overall health and mobility.  He was previously diagnosed with hypertension.  He has managed to control it somewhat with dietary changes, however is struggling to lose weight.  He is unable to exercise as much as he would like due to work.  He needs help controlling portion size.  He is interested in pursuing laparoscopic sleeve gastrectomy.\par \par Medical history is significant for hypertension.\par Surgical history is negative.\par The patient denies prior history of blood clot or abnormal bleeding.\par The patient denies prior history of reflux or dysphagia.\par The patient smokes 2 to 4 cigarettes/week.\par

## 2020-07-06 NOTE — ASSESSMENT
[FreeTextEntry1] : 39-year-old male with morbid obesity (height 6 feet, weight 350 pounds, BMI 47.5) and comorbidity of hypertension presenting for evaluation for laparoscopic sleeve gastrectomy.\par \par The patient has failed multiple prior attempts at weight loss and is now dealing with the side effects, risk factors, and poor quality of life associated with morbid obesity.  They would benefit from surgical weight loss as outlined in the NIH and  ASMBS consensus statements on bariatric surgery.  Surgical intervention is medically indicated.\par \par My impression is that the patient is a reasonable candidate for laparoscopic sleeve gastrectomy.\par

## 2020-07-06 NOTE — PLAN
[FreeTextEntry1] : I have discussed my impression and treatment plan with the patient.  All surgical options were discussed including non-surgical treatments.  The patient would like to proceed with laparoscopic sleeve gastrectomy.  \par \par Benefits and risks of the planned surgery were discussed in depth, particularly leak, bleeding, sepsis, fistula, GERD, blood clots, dysphagia, malnutrition, weight regain, prolonged hospital stay and the remote possibility of death.   Also discussed was the importance of adhering to the recommended nutritional guidelines and supplements and attending regular follow-up.  I reviewed the critical importance of behavioral modification and follow-up in order to optimize outcomes and avoid complications. The patient was given the opportunity to ask pertinent questions and expressed good understanding of the aforementioned issues.\par \par Plan will be for laparoscopic sleeve gastrectomy after completion of:\par - medical weight management program\par - upper endoscopy\par - nutritional evaluation\par - medical, pulmonary and cardiac clearance\par - psychological evaluation\par \par I have discussed with the patient the requirement that he quit smoking prior to surgery.

## 2020-07-07 DIAGNOSIS — Z01.818 ENCOUNTER FOR OTHER PREPROCEDURAL EXAMINATION: ICD-10-CM

## 2021-04-23 NOTE — ED ADULT TRIAGE NOTE - HEART RATE (BEATS/MIN)
Patient received your message in regards to her US results. She listed the main concerns that she is having. Please see [employer] message and advise. Abeba Mercado RN     [phone]

## 2021-10-06 PROBLEM — I10 ESSENTIAL HYPERTENSION: Status: ACTIVE | Noted: 2020-07-06

## 2022-11-13 ENCOUNTER — INPATIENT (INPATIENT)
Facility: HOSPITAL | Age: 42
LOS: 1 days | Discharge: HOME | End: 2022-11-15
Attending: STUDENT IN AN ORGANIZED HEALTH CARE EDUCATION/TRAINING PROGRAM | Admitting: STUDENT IN AN ORGANIZED HEALTH CARE EDUCATION/TRAINING PROGRAM
Payer: COMMERCIAL

## 2022-11-13 VITALS
SYSTOLIC BLOOD PRESSURE: 153 MMHG | TEMPERATURE: 98 F | HEART RATE: 100 BPM | OXYGEN SATURATION: 100 % | DIASTOLIC BLOOD PRESSURE: 85 MMHG | RESPIRATION RATE: 18 BRPM

## 2022-11-13 PROCEDURE — 93010 ELECTROCARDIOGRAM REPORT: CPT | Mod: NC

## 2022-11-13 PROCEDURE — 99285 EMERGENCY DEPT VISIT HI MDM: CPT

## 2022-11-13 NOTE — ED ADULT TRIAGE NOTE - CHIEF COMPLAINT QUOTE
Pt reporting to the ED for RUQ pain starting yesterday. No nausea, vomiting. pmh HTN. Reports he started taking "double doses" of his metoprolol, losartan and amlodipine for 2 days ( not advised by his MD). Did not take medication today. Awaiting ekg

## 2022-11-14 ENCOUNTER — TRANSCRIPTION ENCOUNTER (OUTPATIENT)
Age: 42
End: 2022-11-14

## 2022-11-14 DIAGNOSIS — R10.811 RIGHT UPPER QUADRANT ABDOMINAL TENDERNESS: ICD-10-CM

## 2022-11-14 DIAGNOSIS — I10 ESSENTIAL (PRIMARY) HYPERTENSION: ICD-10-CM

## 2022-11-14 DIAGNOSIS — I26.99 OTHER PULMONARY EMBOLISM WITHOUT ACUTE COR PULMONALE: ICD-10-CM

## 2022-11-14 DIAGNOSIS — Z90.3 ACQUIRED ABSENCE OF STOMACH [PART OF]: Chronic | ICD-10-CM

## 2022-11-14 LAB
ALBUMIN SERPL ELPH-MCNC: 3.8 G/DL — SIGNIFICANT CHANGE UP (ref 3.3–5)
ALP SERPL-CCNC: 64 U/L — SIGNIFICANT CHANGE UP (ref 40–120)
ALT FLD-CCNC: 28 U/L — SIGNIFICANT CHANGE UP (ref 4–41)
ANION GAP SERPL CALC-SCNC: 12 MMOL/L — SIGNIFICANT CHANGE UP (ref 7–14)
APTT BLD: 125.3 SEC — CRITICAL HIGH (ref 27–36.3)
APTT BLD: 27.8 SEC — SIGNIFICANT CHANGE UP (ref 27–36.3)
APTT BLD: 73.6 SEC — HIGH (ref 27–36.3)
AST SERPL-CCNC: 18 U/L — SIGNIFICANT CHANGE UP (ref 4–40)
BASE EXCESS BLDV CALC-SCNC: 3.1 MMOL/L — HIGH (ref -2–3)
BASOPHILS # BLD AUTO: 0.07 K/UL — SIGNIFICANT CHANGE UP (ref 0–0.2)
BASOPHILS NFR BLD AUTO: 0.8 % — SIGNIFICANT CHANGE UP (ref 0–2)
BILIRUB SERPL-MCNC: 0.7 MG/DL — SIGNIFICANT CHANGE UP (ref 0.2–1.2)
BLOOD GAS VENOUS COMPREHENSIVE RESULT: SIGNIFICANT CHANGE UP
BUN SERPL-MCNC: 7 MG/DL — SIGNIFICANT CHANGE UP (ref 7–23)
CALCIUM SERPL-MCNC: 9.2 MG/DL — SIGNIFICANT CHANGE UP (ref 8.4–10.5)
CHLORIDE BLDV-SCNC: 103 MMOL/L — SIGNIFICANT CHANGE UP (ref 96–108)
CHLORIDE SERPL-SCNC: 98 MMOL/L — SIGNIFICANT CHANGE UP (ref 98–107)
CO2 BLDV-SCNC: 30.5 MMOL/L — HIGH (ref 22–26)
CO2 SERPL-SCNC: 24 MMOL/L — SIGNIFICANT CHANGE UP (ref 22–31)
CREAT SERPL-MCNC: 1.1 MG/DL — SIGNIFICANT CHANGE UP (ref 0.5–1.3)
EGFR: 86 ML/MIN/1.73M2 — SIGNIFICANT CHANGE UP
EOSINOPHIL # BLD AUTO: 0.24 K/UL — SIGNIFICANT CHANGE UP (ref 0–0.5)
EOSINOPHIL NFR BLD AUTO: 2.7 % — SIGNIFICANT CHANGE UP (ref 0–6)
GAS PNL BLDV: 136 MMOL/L — SIGNIFICANT CHANGE UP (ref 136–145)
GAS PNL BLDV: SIGNIFICANT CHANGE UP
GLUCOSE BLDV-MCNC: 86 MG/DL — SIGNIFICANT CHANGE UP (ref 70–99)
GLUCOSE SERPL-MCNC: 89 MG/DL — SIGNIFICANT CHANGE UP (ref 70–99)
HCO3 BLDV-SCNC: 29 MMOL/L — SIGNIFICANT CHANGE UP (ref 22–29)
HCT VFR BLD CALC: 36.9 % — LOW (ref 39–50)
HCT VFR BLD CALC: 37.4 % — LOW (ref 39–50)
HCT VFR BLDA CALC: 37 % — LOW (ref 39–51)
HGB BLD CALC-MCNC: 12.3 G/DL — LOW (ref 13–17)
HGB BLD-MCNC: 12.2 G/DL — LOW (ref 13–17)
HGB BLD-MCNC: 12.5 G/DL — LOW (ref 13–17)
IANC: 4.83 K/UL — SIGNIFICANT CHANGE UP (ref 1.8–7.4)
IMM GRANULOCYTES NFR BLD AUTO: 0.2 % — SIGNIFICANT CHANGE UP (ref 0–0.9)
INR BLD: 1.22 RATIO — HIGH (ref 0.88–1.16)
LACTATE BLDV-MCNC: 1 MMOL/L — SIGNIFICANT CHANGE UP (ref 0.5–2)
LIDOCAIN IGE QN: 47 U/L — SIGNIFICANT CHANGE UP (ref 7–60)
LYMPHOCYTES # BLD AUTO: 2.74 K/UL — SIGNIFICANT CHANGE UP (ref 1–3.3)
LYMPHOCYTES # BLD AUTO: 31.2 % — SIGNIFICANT CHANGE UP (ref 13–44)
MCHC RBC-ENTMCNC: 30.4 PG — SIGNIFICANT CHANGE UP (ref 27–34)
MCHC RBC-ENTMCNC: 30.8 PG — SIGNIFICANT CHANGE UP (ref 27–34)
MCHC RBC-ENTMCNC: 33.1 GM/DL — SIGNIFICANT CHANGE UP (ref 32–36)
MCHC RBC-ENTMCNC: 33.4 GM/DL — SIGNIFICANT CHANGE UP (ref 32–36)
MCV RBC AUTO: 92 FL — SIGNIFICANT CHANGE UP (ref 80–100)
MCV RBC AUTO: 92.1 FL — SIGNIFICANT CHANGE UP (ref 80–100)
MONOCYTES # BLD AUTO: 0.89 K/UL — SIGNIFICANT CHANGE UP (ref 0–0.9)
MONOCYTES NFR BLD AUTO: 10.1 % — SIGNIFICANT CHANGE UP (ref 2–14)
NEUTROPHILS # BLD AUTO: 4.83 K/UL — SIGNIFICANT CHANGE UP (ref 1.8–7.4)
NEUTROPHILS NFR BLD AUTO: 55 % — SIGNIFICANT CHANGE UP (ref 43–77)
NRBC # BLD: 0 /100 WBCS — SIGNIFICANT CHANGE UP (ref 0–0)
NRBC # BLD: 0 /100 WBCS — SIGNIFICANT CHANGE UP (ref 0–0)
NRBC # FLD: 0 K/UL — SIGNIFICANT CHANGE UP (ref 0–0)
NRBC # FLD: 0 K/UL — SIGNIFICANT CHANGE UP (ref 0–0)
PCO2 BLDV: 49 MMHG — SIGNIFICANT CHANGE UP (ref 42–55)
PH BLDV: 7.38 — SIGNIFICANT CHANGE UP (ref 7.32–7.43)
PLATELET # BLD AUTO: 267 K/UL — SIGNIFICANT CHANGE UP (ref 150–400)
PLATELET # BLD AUTO: 267 K/UL — SIGNIFICANT CHANGE UP (ref 150–400)
PO2 BLDV: 34 MMHG — SIGNIFICANT CHANGE UP
POTASSIUM BLDV-SCNC: 3.2 MMOL/L — LOW (ref 3.5–5.1)
POTASSIUM SERPL-MCNC: 3.3 MMOL/L — LOW (ref 3.5–5.3)
POTASSIUM SERPL-SCNC: 3.3 MMOL/L — LOW (ref 3.5–5.3)
PROT SERPL-MCNC: 7.2 G/DL — SIGNIFICANT CHANGE UP (ref 6–8.3)
PROTHROM AB SERPL-ACNC: 14.2 SEC — HIGH (ref 10.5–13.4)
RBC # BLD: 4.01 M/UL — LOW (ref 4.2–5.8)
RBC # BLD: 4.06 M/UL — LOW (ref 4.2–5.8)
RBC # FLD: 13.3 % — SIGNIFICANT CHANGE UP (ref 10.3–14.5)
RBC # FLD: 13.5 % — SIGNIFICANT CHANGE UP (ref 10.3–14.5)
SAO2 % BLDV: 50.8 % — SIGNIFICANT CHANGE UP
SARS-COV-2 RNA SPEC QL NAA+PROBE: SIGNIFICANT CHANGE UP
SODIUM SERPL-SCNC: 134 MMOL/L — LOW (ref 135–145)
TROPONIN T, HIGH SENSITIVITY RESULT: 10 NG/L — SIGNIFICANT CHANGE UP
TROPONIN T, HIGH SENSITIVITY RESULT: 10 NG/L — SIGNIFICANT CHANGE UP
WBC # BLD: 8.79 K/UL — SIGNIFICANT CHANGE UP (ref 3.8–10.5)
WBC # BLD: 8.96 K/UL — SIGNIFICANT CHANGE UP (ref 3.8–10.5)
WBC # FLD AUTO: 8.79 K/UL — SIGNIFICANT CHANGE UP (ref 3.8–10.5)
WBC # FLD AUTO: 8.96 K/UL — SIGNIFICANT CHANGE UP (ref 3.8–10.5)

## 2022-11-14 PROCEDURE — 71275 CT ANGIOGRAPHY CHEST: CPT | Mod: 26,MA

## 2022-11-14 PROCEDURE — 76705 ECHO EXAM OF ABDOMEN: CPT | Mod: 26

## 2022-11-14 PROCEDURE — 12345: CPT | Mod: NC

## 2022-11-14 PROCEDURE — 99223 1ST HOSP IP/OBS HIGH 75: CPT

## 2022-11-14 PROCEDURE — 71046 X-RAY EXAM CHEST 2 VIEWS: CPT | Mod: 26

## 2022-11-14 PROCEDURE — 74177 CT ABD & PELVIS W/CONTRAST: CPT | Mod: 26,MA

## 2022-11-14 RX ORDER — POTASSIUM CHLORIDE 20 MEQ
40 PACKET (EA) ORAL ONCE
Refills: 0 | Status: COMPLETED | OUTPATIENT
Start: 2022-11-14 | End: 2022-11-14

## 2022-11-14 RX ORDER — APIXABAN 2.5 MG/1
1 TABLET, FILM COATED ORAL
Qty: 70 | Refills: 0
Start: 2022-11-14 | End: 2022-12-13

## 2022-11-14 RX ORDER — LANOLIN ALCOHOL/MO/W.PET/CERES
3 CREAM (GRAM) TOPICAL AT BEDTIME
Refills: 0 | Status: DISCONTINUED | OUTPATIENT
Start: 2022-11-14 | End: 2022-11-15

## 2022-11-14 RX ORDER — LOSARTAN POTASSIUM 100 MG/1
25 TABLET, FILM COATED ORAL DAILY
Refills: 0 | Status: DISCONTINUED | OUTPATIENT
Start: 2022-11-14 | End: 2022-11-15

## 2022-11-14 RX ORDER — HEPARIN SODIUM 5000 [USP'U]/ML
10000 INJECTION INTRAVENOUS; SUBCUTANEOUS EVERY 6 HOURS
Refills: 0 | Status: DISCONTINUED | OUTPATIENT
Start: 2022-11-14 | End: 2022-11-15

## 2022-11-14 RX ORDER — HEPARIN SODIUM 5000 [USP'U]/ML
10000 INJECTION INTRAVENOUS; SUBCUTANEOUS ONCE
Refills: 0 | Status: COMPLETED | OUTPATIENT
Start: 2022-11-14 | End: 2022-11-14

## 2022-11-14 RX ORDER — HEPARIN SODIUM 5000 [USP'U]/ML
INJECTION INTRAVENOUS; SUBCUTANEOUS
Qty: 25000 | Refills: 0 | Status: DISCONTINUED | OUTPATIENT
Start: 2022-11-14 | End: 2022-11-15

## 2022-11-14 RX ORDER — AMLODIPINE BESYLATE 2.5 MG/1
10 TABLET ORAL DAILY
Refills: 0 | Status: DISCONTINUED | OUTPATIENT
Start: 2022-11-14 | End: 2022-11-15

## 2022-11-14 RX ORDER — ONDANSETRON 8 MG/1
4 TABLET, FILM COATED ORAL EVERY 8 HOURS
Refills: 0 | Status: DISCONTINUED | OUTPATIENT
Start: 2022-11-14 | End: 2022-11-15

## 2022-11-14 RX ORDER — ACETAMINOPHEN 500 MG
650 TABLET ORAL EVERY 6 HOURS
Refills: 0 | Status: DISCONTINUED | OUTPATIENT
Start: 2022-11-14 | End: 2022-11-15

## 2022-11-14 RX ORDER — SODIUM CHLORIDE 9 MG/ML
1000 INJECTION INTRAMUSCULAR; INTRAVENOUS; SUBCUTANEOUS ONCE
Refills: 0 | Status: COMPLETED | OUTPATIENT
Start: 2022-11-14 | End: 2022-11-14

## 2022-11-14 RX ORDER — METOPROLOL TARTRATE 50 MG
50 TABLET ORAL DAILY
Refills: 0 | Status: DISCONTINUED | OUTPATIENT
Start: 2022-11-14 | End: 2022-11-15

## 2022-11-14 RX ORDER — MORPHINE SULFATE 50 MG/1
4 CAPSULE, EXTENDED RELEASE ORAL ONCE
Refills: 0 | Status: DISCONTINUED | OUTPATIENT
Start: 2022-11-14 | End: 2022-11-14

## 2022-11-14 RX ORDER — ENOXAPARIN SODIUM 100 MG/ML
130 INJECTION SUBCUTANEOUS
Qty: 14 | Refills: 0
Start: 2022-11-14 | End: 2022-11-20

## 2022-11-14 RX ORDER — HEPARIN SODIUM 5000 [USP'U]/ML
5000 INJECTION INTRAVENOUS; SUBCUTANEOUS EVERY 6 HOURS
Refills: 0 | Status: DISCONTINUED | OUTPATIENT
Start: 2022-11-14 | End: 2022-11-15

## 2022-11-14 RX ADMIN — HEPARIN SODIUM 2300 UNIT(S)/HR: 5000 INJECTION INTRAVENOUS; SUBCUTANEOUS at 06:15

## 2022-11-14 RX ADMIN — HEPARIN SODIUM 2000 UNIT(S)/HR: 5000 INJECTION INTRAVENOUS; SUBCUTANEOUS at 14:00

## 2022-11-14 RX ADMIN — Medication 40 MILLIEQUIVALENT(S): at 02:30

## 2022-11-14 RX ADMIN — MORPHINE SULFATE 4 MILLIGRAM(S): 50 CAPSULE, EXTENDED RELEASE ORAL at 00:40

## 2022-11-14 RX ADMIN — HEPARIN SODIUM 2300 UNIT(S)/HR: 5000 INJECTION INTRAVENOUS; SUBCUTANEOUS at 06:52

## 2022-11-14 RX ADMIN — SODIUM CHLORIDE 1000 MILLILITER(S): 9 INJECTION INTRAMUSCULAR; INTRAVENOUS; SUBCUTANEOUS at 00:40

## 2022-11-14 RX ADMIN — HEPARIN SODIUM 2000 UNIT(S)/HR: 5000 INJECTION INTRAVENOUS; SUBCUTANEOUS at 20:48

## 2022-11-14 RX ADMIN — HEPARIN SODIUM 2000 UNIT(S)/HR: 5000 INJECTION INTRAVENOUS; SUBCUTANEOUS at 18:20

## 2022-11-14 RX ADMIN — HEPARIN SODIUM 10000 UNIT(S): 5000 INJECTION INTRAVENOUS; SUBCUTANEOUS at 06:15

## 2022-11-14 RX ADMIN — HEPARIN SODIUM 2300 UNIT(S)/HR: 5000 INJECTION INTRAVENOUS; SUBCUTANEOUS at 07:17

## 2022-11-14 NOTE — H&P ADULT - NSHPLABSRESULTS_GEN_ALL_CORE
labs reviewed                        12.2   8.79  )-----------( 267      ( 14 Nov 2022 00:30 )             36.9       11-14    134<L>  |  98  |  7   ----------------------------<  89  3.3<L>   |  24  |  1.10    Ca    9.2      14 Nov 2022 00:30    TPro  7.2  /  Alb  3.8  /  TBili  0.7  /  DBili  x   /  AST  18  /  ALT  28  /  AlkPhos  64  11-14    PT/INR - ( 14 Nov 2022 04:42 )   PT: 14.2 sec;   INR: 1.22 ratio         PTT - ( 14 Nov 2022 04:42 )  PTT:27.8 sec    00:30 - VBG - pH: 7.38  | pCO2: 49    | pO2: 34    | Lactate: 1.0        EKG interpreted by myself: nsr  CTPA interpreted by radiology: : Pan lobar segmental and subsegmental pulmonary emboli No CT evidence of right heart strain.  Right lower lobe segmental consolidation likely pulmonary infarct.  Small right pleural effusion.    RUQ ultrasound interpreted by radiology: No cholelithiasis, pericholecystic fluid, or cysts or gallbladder wall thickening.  CT abdo/pelvis: Right basilar lower lobe consolidation, likely pneumonia. Trace parapneumonic effusion.  No acute abdominal pathology.

## 2022-11-14 NOTE — DISCHARGE NOTE PROVIDER - HOSPITAL COURSE
42 yr old male with a pmh of gastric sleeve at [facility] November 2021, HTN on Losartan,  Amlodipine and metoprolol who presents with a 1-2 day history of RUQ sharp nonradiating pain found to have acute pulmonary embolism. troponin flat, CT w/out RV strain. Pt denying any BLANCAS, SOB, or chest pain. Pt has high deductible plan, agreeable to pay for Eliquis. For now will prescribe 1 month supply from [facility]. Pt will followup with his PMD for further refills. D/w pt and ACP on day of discharge. Clinical Pharmacist Britta to provide DOAC education prior to discharge.

## 2022-11-14 NOTE — ED PROVIDER NOTE - PHYSICAL EXAMINATION
G: NAD, cooperative with exam   H: NCAT  E: EOMI   M: Mucous membranes moist   R: CTABL, nWOB  C: RRR  A: Soft, RUQ TTP, ND, no rebound/guarding   MSK: no calf tenderness, no LE edema

## 2022-11-14 NOTE — DISCHARGE NOTE PROVIDER - NSDCMRMEDTOKEN_GEN_ALL_CORE_FT
AMLODIPINE 10MG TAB:   Eliquis 5 mg oral tablet: Take 10mg PO BID x 7 days followed by 5mg PO BID thereafter     TEST RX PAGE ACP 28900 WITH SANTIZO   LOSARTAN 25MG TABLETS:   METOPROL TAR 50MG TAB:    AMLODIPINE 10MG TAB: tab(s) orally once a day  Eliquis Starter Pack for Treatment of DVT and PE 5 mg oral tablet: 2 tab(s) orally every 12 hours  losartan 25 mg oral tablet: 1 tab(s) orally once a day  METOPROL TAR 50MG TAB: tab(s) orally once a day

## 2022-11-14 NOTE — PROVIDER CONTACT NOTE (CRITICAL VALUE NOTIFICATION) - ASSESSMENT
Pt on heparin gtt; aPTT resulted 125.3; heparin nomogram followed. As per nomogram decrease rate to 20mL/hr. Next aPTT due for 19:30. Will continue to monitor.

## 2022-11-14 NOTE — DISCHARGE NOTE PROVIDER - NSDCCPCAREPLAN_GEN_ALL_CORE_FT
PRINCIPAL DISCHARGE DIAGNOSIS  Diagnosis: Pulmonary embolism  Assessment and Plan of Treatment: You were admitted to the [facility] and diagnosed with a pulmonary embolism, which is a blood clot in your lung. This could happen due to long periods of inactivity, such as long car rides or plane rides.   Please take your Eliquis, 1 one month supply has been prescribed to [facility]. You will need to followup with your PMD for refills and will have a copay.         SECONDARY DISCHARGE DIAGNOSES  Diagnosis: Essential hypertension  Assessment and Plan of Treatment: Take your home meds as prescribed by your medical doctor.

## 2022-11-14 NOTE — ED PROVIDER NOTE - CLINICAL SUMMARY MEDICAL DECISION MAKING FREE TEXT BOX
43yo M PMH gastric sleeve at [facility] November 2021, HTN on Losartan and Amlodipine presents today with RUQ abdominal pain. Differential includes pancreatitis vs other gallbladder pathology. TTP over RUQ w guarding. Will obtain labs, US, give IVF, analgesics, reassess

## 2022-11-14 NOTE — CHART NOTE - NSCHARTNOTEFT_GEN_A_CORE
saw and examined pt  acute PE - possibly provoked (long bus ride recently), no personal of fhx of clotting disorders  avelino heparin gtt, will plan to transition to DOAC  trop flat, no RV strain on CT and wo hypoxemia, tachypnea, or SOB - no need for TTE  monitor VS and tolerance to AC today and if stable transition to DOAC josefa and DC home w out-pt heme f.u saw and examined pt  acute PE - possibly provoked (long bus ride recently), no personal of fhx of clotting disorders  avelino heparin gtt, will plan to transition to DOAC  trop flat, no RV strain on CT and wo hypoxemia, tachypnea, or SOB - no need for TTE  monitor VS and tolerance to AC today and if stable transition to DOAC josefa and DC home w out-pt heme f.u      addendum   per pharmacy high co-pay for DOAC, will price check Lovenox - will dw pt on preference - if able to handle co-pay can transition to DOAC, otherwise if Lovenox cost reasonable will dc on lovenox and coumadin bridge - if lovenox cost also high, need to heparin-coumadin bridge inpt in setting of acute PE

## 2022-11-14 NOTE — ED PROVIDER NOTE - OBJECTIVE STATEMENT
Gastric sleeve René November 2021 43yo M PMH gastric sleeve at [facility] November 2021, HTN on Losartan and Amlodipine presents today with RUQ abdominal pain. Onset 4pm yesterday after eating buenrostro. Constant. Has not taken any meds for symptom relief. Drank 3 glasses of wine everyday for the past 3 days. States he is not a daily drinker. No F/C/NS/N/V/D. Last week pt states he was experiencing palpitations, took double doses of his antihypertensives for 3 days, then started taking them as prescribed again. Currently with no palpitations. Denies chest pain or SOB. Pain worsens with mov't. Similar pain in the past that self resolved.

## 2022-11-14 NOTE — CHART NOTE - NSCHARTNOTEFT_GEN_A_CORE
Sent test script of eliquis to [facility] to see coverage/cost in preparation for AC on discharge.   [facility] has eliquis starter pack free trial for 1 month, then due to patients high deductible of $5500, the monthly copay would be $637.14 per [facility].   The cost would be the same for xarelto per [facility].   Lovenox 7 day supply would be $284.10 if plan were to be bridging to coumadin at home.   Also, plan of inpatient heparin gtt bridge to coumadin was discussed with patient.   Patient opted to take eliquis and states he will take the first month free with trial then arrange his finances to afford the high monthly cost.  He reports being busy in his personal life and does not want to take injections or get INR checks.   A new Rx for eliquis will need to be sent to [facility] on discharge.  Patient otherwise currently stable, will continue to monitor.   Above plan discussed with attending.

## 2022-11-14 NOTE — H&P ADULT - NSHPPHYSICALEXAM_GEN_ALL_CORE
PHYSICAL EXAM:  GENERAL: NAD, well-developed, well-nourished  HEAD:  Atraumatic, Normocephalic  EYES: EOMI, PERRL, conjunctiva and sclera clear  NECK: Supple, No JVD  CHEST/LUNG: Clear to auscultation bilaterally; No wheezes, rales or rhonchi  HEART: Regular rate and rhythm; No murmurs, rubs, or gallops, (+)S1, S2  ABDOMEN: Soft, Staton's positive, Nondistended; Normal Bowel sounds   EXTREMITIES:  2+ Peripheral Pulses, No clubbing, cyanosis, or edema  PSYCH: normal mood and affect  NEUROLOGY: AAOx3, non-focal  SKIN: No rashes or lesions

## 2022-11-14 NOTE — H&P ADULT - HISTORY OF PRESENT ILLNESS
42 yr old male with a pmh of gastric sleeve at [facility] November 2021, HTN on Losartan,  Amlodipine and metoprolol who presents with a 1-2 day history of RUQ sharp nonradiating pain. Initially he thought nothing of it as he thought it was from taking extra blood pressure medication last week for palpitations. Today whilst at work he decided to come in and have it checked out.   Reports having symptoms like this in the past year which usually resolve with rest.  Denies any clotting history in the family, does report a bus trip to [place] (6hrs) 2 weeks ago. He did have right calf pain intermittently for one day but then that resolved (no swelling)  Denies  headache, dizziness, chest pain, palpitations, SOB,  joint pain, diarrhea/constipation, urinary symptoms.   Vitals: T 98.2, HR 96, /94, RR 15 satting 100% RA

## 2022-11-14 NOTE — DISCHARGE NOTE NURSING/CASE MANAGEMENT/SOCIAL WORK - PATIENT PORTAL LINK FT
You can access the FollowMyHealth Patient Portal offered by [facility] by registering at the following website: [email or web address]. By joining [facility]’s FollowMyHealth portal, you will also be able to view your health information using other applications (apps) compatible with our system.

## 2022-11-14 NOTE — DISCHARGE NOTE PROVIDER - ATTENDING DISCHARGE PHYSICAL EXAMINATION:
Constitutional: WDWN resting comfortably in bed; NAD  Neck: supple; no JVD or thyromegaly  Respiratory: CTA B/L; no W/R/R, no retractions  Cardiac: +S1/S2; RRR; no M/R/G; PMI non-displaced  Gastrointestinal: soft, NT/ND; no rebound or guarding; +BSx4  Back: spine midline, no bony tenderness or step-offs; no CVAT B/L  Extremities: WWP, no clubbing or cyanosis; no peripheral edema  Musculoskeletal: NROM x4; no joint swelling, tenderness or erythema  Vascular: 2+ radial, femoral, DP/PT pulses B/L  Dermatologic: skin warm, dry and intact; no rashes, wounds, or scars  Lymphatic: no submandibular or cervical LAD  Neurologic: AAOx3; CNII-XII grossly intact; no focal deficits  Psychiatric: affect and characteristics of appearance, verbalizations, behaviors are appropriate

## 2022-11-14 NOTE — ED ADULT NURSE REASSESSMENT NOTE - NS ED NURSE REASSESS COMMENT FT1
Pt started on heparin drip at  23 ml/hr as per  nomogram. Pt educated on signs and symptoms of bleeding with proper feed back. No complaints of nausea, dizziness, vomiting  SOB, fever, chills verbalized. pt on cardiac monitor (NSR).

## 2022-11-14 NOTE — H&P ADULT - NSHPREVIEWOFSYSTEMS_GEN_ALL_CORE
REVIEW OF SYSTEMS:    CONSTITUTIONAL: No weakness, fevers or chills  EYES/ENT: No visual changes;  No dysphagia; No sore throat; No rhinorrhea; No sinus pain/pressure  NECK: No pain or stiffness  RESPIRATORY: No cough, wheezing, hemoptysis; No shortness of breath  CARDIOVASCULAR: No chest pain or palpitations; No lower extremity edema  GASTROINTESTINAL: +RUQ pain. No nausea, vomiting, or hematemesis; No diarrhea or constipation. No melena or hematochezia.  GENITOURINARY: No dysuria, frequency or hematuria  NEUROLOGICAL: No numbness or weakness  MSK: ambulates without assistance   SKIN: No itching, burning, rashes, or lesions   All other review of systems is negative unless indicated above.

## 2022-11-14 NOTE — DISCHARGE NOTE NURSING/CASE MANAGEMENT/SOCIAL WORK - NSDCPEFALRISK_GEN_ALL_CORE
For information on Fall & Injury Prevention, visit: [email or web address] OR  [email or web address] OR  https://www.cdc.gov/steadi/patient.html

## 2022-11-14 NOTE — ED ADULT NURSE NOTE - OBJECTIVE STATEMENT
Break RN note- Patient arrives to the ED for right sided chest/abdominal pain since yesterday. A&Ox4. Patient denies any nausea or vomiting. Patient reports he was taking "double doses" of his metoprolol, losartan, and amlodipine for the past 2 days as he thought it may help his discomfort. Patient denies taking his blood pressure before doing this. Patient denies any headache, chest pain, dizziness, or SOB. Right sided chest/ abdomen tender to touch. Patient breathing even and nonlabored. No acute distress. 18g Iv placed to left arm. Labs sent  as ordered. COVID swab sent. Patient medicated as ordered. Safety maintained. Patient stable upon being taken to Ultrasound.

## 2022-11-14 NOTE — ED PROVIDER NOTE - ATTENDING CONTRIBUTION TO CARE
Afebrile. Awake and Alert. Lungs CTA. Heart RRR. Abdomen soft, +RUQ TTP, ND. CN II-XII grossly intact. Moves all extremities without lateralization.    r/o cholecystitis  r/o gastritis  r/o gastric sleeve leak  r/o SBO

## 2022-11-15 VITALS
TEMPERATURE: 98 F | OXYGEN SATURATION: 99 % | SYSTOLIC BLOOD PRESSURE: 140 MMHG | HEART RATE: 84 BPM | DIASTOLIC BLOOD PRESSURE: 84 MMHG | RESPIRATION RATE: 18 BRPM

## 2022-11-15 LAB
ALBUMIN SERPL ELPH-MCNC: 3.3 G/DL — SIGNIFICANT CHANGE UP (ref 3.3–5)
ALP SERPL-CCNC: 55 U/L — SIGNIFICANT CHANGE UP (ref 40–120)
ALT FLD-CCNC: 21 U/L — SIGNIFICANT CHANGE UP (ref 4–41)
ANION GAP SERPL CALC-SCNC: 9 MMOL/L — SIGNIFICANT CHANGE UP (ref 7–14)
APTT BLD: 69.7 SEC — HIGH (ref 27–36.3)
AST SERPL-CCNC: 16 U/L — SIGNIFICANT CHANGE UP (ref 4–40)
BILIRUB SERPL-MCNC: 0.5 MG/DL — SIGNIFICANT CHANGE UP (ref 0.2–1.2)
BUN SERPL-MCNC: 5 MG/DL — LOW (ref 7–23)
CALCIUM SERPL-MCNC: 9 MG/DL — SIGNIFICANT CHANGE UP (ref 8.4–10.5)
CHLORIDE SERPL-SCNC: 105 MMOL/L — SIGNIFICANT CHANGE UP (ref 98–107)
CO2 SERPL-SCNC: 26 MMOL/L — SIGNIFICANT CHANGE UP (ref 22–31)
CREAT SERPL-MCNC: 0.9 MG/DL — SIGNIFICANT CHANGE UP (ref 0.5–1.3)
EGFR: 110 ML/MIN/1.73M2 — SIGNIFICANT CHANGE UP
GLUCOSE SERPL-MCNC: 117 MG/DL — HIGH (ref 70–99)
HCT VFR BLD CALC: 39.1 % — SIGNIFICANT CHANGE UP (ref 39–50)
HGB BLD-MCNC: 13.1 G/DL — SIGNIFICANT CHANGE UP (ref 13–17)
MAGNESIUM SERPL-MCNC: 2.2 MG/DL — SIGNIFICANT CHANGE UP (ref 1.6–2.6)
MCHC RBC-ENTMCNC: 30.7 PG — SIGNIFICANT CHANGE UP (ref 27–34)
MCHC RBC-ENTMCNC: 33.5 GM/DL — SIGNIFICANT CHANGE UP (ref 32–36)
MCV RBC AUTO: 91.6 FL — SIGNIFICANT CHANGE UP (ref 80–100)
NRBC # BLD: 0 /100 WBCS — SIGNIFICANT CHANGE UP (ref 0–0)
NRBC # FLD: 0 K/UL — SIGNIFICANT CHANGE UP (ref 0–0)
PHOSPHATE SERPL-MCNC: 2.8 MG/DL — SIGNIFICANT CHANGE UP (ref 2.5–4.5)
PLATELET # BLD AUTO: 266 K/UL — SIGNIFICANT CHANGE UP (ref 150–400)
POTASSIUM SERPL-MCNC: 3.3 MMOL/L — LOW (ref 3.5–5.3)
POTASSIUM SERPL-SCNC: 3.3 MMOL/L — LOW (ref 3.5–5.3)
PROT SERPL-MCNC: 6.6 G/DL — SIGNIFICANT CHANGE UP (ref 6–8.3)
RBC # BLD: 4.27 M/UL — SIGNIFICANT CHANGE UP (ref 4.2–5.8)
RBC # FLD: 13.3 % — SIGNIFICANT CHANGE UP (ref 10.3–14.5)
SODIUM SERPL-SCNC: 140 MMOL/L — SIGNIFICANT CHANGE UP (ref 135–145)
WBC # BLD: 6.74 K/UL — SIGNIFICANT CHANGE UP (ref 3.8–10.5)
WBC # FLD AUTO: 6.74 K/UL — SIGNIFICANT CHANGE UP (ref 3.8–10.5)

## 2022-11-15 PROCEDURE — 99239 HOSP IP/OBS DSCHRG MGMT >30: CPT

## 2022-11-15 RX ORDER — POTASSIUM CHLORIDE 20 MEQ
40 PACKET (EA) ORAL ONCE
Refills: 0 | Status: DISCONTINUED | OUTPATIENT
Start: 2022-11-15 | End: 2022-11-15

## 2022-11-15 RX ORDER — METOPROLOL TARTRATE 50 MG
0 TABLET ORAL
Qty: 0 | Refills: 0 | DISCHARGE

## 2022-11-15 RX ORDER — APIXABAN 2.5 MG/1
2 TABLET, FILM COATED ORAL
Qty: 120 | Refills: 0
Start: 2022-11-15 | End: 2022-12-14

## 2022-11-15 RX ORDER — LOSARTAN POTASSIUM 100 MG/1
0 TABLET, FILM COATED ORAL
Qty: 0 | Refills: 4 | DISCHARGE

## 2022-11-15 RX ORDER — AMLODIPINE BESYLATE 2.5 MG/1
0 TABLET ORAL
Qty: 0 | Refills: 0 | DISCHARGE

## 2022-11-15 RX ORDER — APIXABAN 2.5 MG/1
10 TABLET, FILM COATED ORAL EVERY 12 HOURS
Refills: 0 | Status: DISCONTINUED | OUTPATIENT
Start: 2022-11-15 | End: 2022-11-15

## 2022-11-15 RX ORDER — LOSARTAN POTASSIUM 100 MG/1
1 TABLET, FILM COATED ORAL
Qty: 0 | Refills: 0 | DISCHARGE
Start: 2022-11-15

## 2022-11-15 RX ADMIN — HEPARIN SODIUM 2000 UNIT(S)/HR: 5000 INJECTION INTRAVENOUS; SUBCUTANEOUS at 04:15

## 2022-11-15 RX ADMIN — AMLODIPINE BESYLATE 10 MILLIGRAM(S): 2.5 TABLET ORAL at 05:16

## 2022-11-15 RX ADMIN — Medication 50 MILLIGRAM(S): at 05:16

## 2022-11-15 RX ADMIN — LOSARTAN POTASSIUM 25 MILLIGRAM(S): 100 TABLET, FILM COATED ORAL at 05:16

## 2022-11-15 NOTE — PHARMACOTHERAPY INTERVENTION NOTE - COMMENTS
Discharge medications reviewed with the patient. Current medication schedule was discussed in detail including: medication name, indication, dose, administration times, treatment duration, side effects, drug interactions, and special instructions. Patient questions and concerns were answered and addressed. Micromedex CareNotes provided. Patient demonstrated understanding.    New medications: apixaban    Fidel Montemayor, PharmD, [facility]  Clinical Pharmacy Specialist   [id number]

## 2023-11-14 NOTE — REASON FOR VISIT
73.44 [Initial Consult] : an initial consult for [Morbid Obesity (BMI>40)] : morbid obesity (bmi>40)